# Patient Record
Sex: MALE
[De-identification: names, ages, dates, MRNs, and addresses within clinical notes are randomized per-mention and may not be internally consistent; named-entity substitution may affect disease eponyms.]

---

## 2019-06-19 ENCOUNTER — APPOINTMENT (OUTPATIENT)
Dept: UROLOGY | Facility: CLINIC | Age: 64
End: 2019-06-19
Payer: MEDICARE

## 2019-06-19 DIAGNOSIS — N13.8 BENIGN PROSTATIC HYPERPLASIA WITH LOWER URINARY TRACT SYMPMS: ICD-10-CM

## 2019-06-19 DIAGNOSIS — N40.1 BENIGN PROSTATIC HYPERPLASIA WITH LOWER URINARY TRACT SYMPMS: ICD-10-CM

## 2019-06-19 PROCEDURE — 99204 OFFICE O/P NEW MOD 45 MIN: CPT | Mod: 25

## 2019-06-19 PROCEDURE — 76705 ECHO EXAM OF ABDOMEN: CPT

## 2019-06-19 PROCEDURE — 99203 OFFICE O/P NEW LOW 30 MIN: CPT | Mod: 25

## 2019-06-19 PROCEDURE — 51798 US URINE CAPACITY MEASURE: CPT

## 2019-06-19 NOTE — ASSESSMENT
[FreeTextEntry1] : BPH, LUTS, risk for diabetic cystopathy\par PVR 30 cc; emptying adequately.\par Plan for tamsulosin trial, FUV in 3mo, possible anticholinergic in future or Myrbetriq\par PSA follow up in 6mo \par \par

## 2019-06-19 NOTE — PHYSICAL EXAM
[General Appearance - Well Developed] : well developed [General Appearance - Well Nourished] : well nourished [Normal Appearance] : normal appearance [Well Groomed] : well groomed [General Appearance - In No Acute Distress] : no acute distress [Edema] : no peripheral edema [Respiration, Rhythm And Depth] : normal respiratory rhythm and effort [] : no respiratory distress [Exaggerated Use Of Accessory Muscles For Inspiration] : no accessory muscle use [Abdomen Soft] : soft [Abdomen Tenderness] : non-tender [Abdomen Mass (___ Cm)] : no abdominal mass palpated [Abdomen Hernia] : no hernia was discovered [Costovertebral Angle Tenderness] : no ~M costovertebral angle tenderness [Urethral Meatus] : meatus normal [Urinary Bladder Findings] : the bladder was normal on palpation [Scrotum] : the scrotum was normal [Epididymis] : the epididymides were normal [Testes Tenderness] : no tenderness of the testes [Testes Mass (___cm)] : there were no testicular masses [Prostate Tenderness] : the prostate was not tender [No Prostate Nodules] : no prostate nodules [Prostate Size ___ gm] : prostate size [unfilled] gm [No Focal Deficits] : no focal deficits [No Palpable Adenopathy] : no palpable adenopathy [Inguinal Lymph Nodes Enlarged Bilaterally] : inguinal [FreeTextEntry1] : normal anal sphincter tone

## 2019-06-19 NOTE — HISTORY OF PRESENT ILLNESS
[FreeTextEntry1] : CC: Elevated PSA \par \par HPI: Patient with history of elevated PSA and a prostate biopsy 18 months ago, and he states he bled for a week.  He states the biopsy was negative for cancer  Patient is not on PDEI or alpha blocker.\par \par Associated urinary symptoms include: nocturia 2x, frequency during the day worse with DM medication exacerbates it, a sense of fullness of urination, some urgency and cannot hold and sometimes leaks if "cannot hold it any more", "never wet the pants" but "oh my God its leaking one drop and need to get to bathroom".  \par \par US today, PVR 30, prostate 58cc, bladder normal appearing, no lesions, no median lobe seen, no stones. \par \par Cr 0.99\par UA negative blood, LE, nitrite\par PSA 4.1 ng/mL 6/2019\par PSA 5.0 ng/mL 12/2018\par PSA prior "was always between 4 and 5, never above 5"\par \par MEDHX: DM2\par FAMHX: Negative \par SURGHX: Denies \par SOCIAL: , 2 children, no alcohol, nonsmoker \par ROS: Joint swelling, weight gain, skin issues, otherwise negative 10 systems\par

## 2019-06-28 ENCOUNTER — APPOINTMENT (OUTPATIENT)
Dept: ENDOCRINOLOGY | Facility: CLINIC | Age: 64
End: 2019-06-28
Payer: MEDICARE

## 2019-06-28 VITALS
HEIGHT: 70 IN | WEIGHT: 234 LBS | DIASTOLIC BLOOD PRESSURE: 76 MMHG | BODY MASS INDEX: 33.5 KG/M2 | SYSTOLIC BLOOD PRESSURE: 127 MMHG | HEART RATE: 73 BPM

## 2019-06-28 DIAGNOSIS — E11.9 TYPE 2 DIABETES MELLITUS W/OUT COMPLICATIONS: ICD-10-CM

## 2019-06-28 DIAGNOSIS — Z00.00 ENCOUNTER FOR GENERAL ADULT MEDICAL EXAMINATION W/OUT ABNORMAL FINDINGS: ICD-10-CM

## 2019-06-28 PROCEDURE — 83036 HEMOGLOBIN GLYCOSYLATED A1C: CPT | Mod: QW

## 2019-06-28 PROCEDURE — 97802 MEDICAL NUTRITION INDIV IN: CPT

## 2019-06-28 PROCEDURE — 99204 OFFICE O/P NEW MOD 45 MIN: CPT | Mod: 25

## 2019-06-28 PROCEDURE — 82962 GLUCOSE BLOOD TEST: CPT

## 2019-06-28 RX ORDER — METOPROLOL SUCCINATE 25 MG/1
25 TABLET, EXTENDED RELEASE ORAL
Qty: 30 | Refills: 0 | Status: ACTIVE | COMMUNITY
Start: 2018-05-14

## 2019-06-28 NOTE — PHYSICAL EXAM
[No Lid Lag] : no lid lag [No Proptosis] : no proptosis [Normal Hearing] : hearing was normal [Clear to Auscultation] : lungs were clear to auscultation bilaterally [Normal S1, S2] : normal S1 and S2 [No Edema] : there was no peripheral edema [Regular Rhythm] : with a regular rhythm [Normal Sensation on Monofilament Testing] : normal sensation on monofilament testing of lower extremities [Normal Affect] : the affect was normal [Normal Mood] : the mood was normal [Foot Ulcers] : no foot ulcers [de-identified] : fingerstick 119, fasting, walked here from apartment [de-identified] : thyroid palpable, suboptimal exam (large neck) [de-identified] : 1+ DP pulse [de-identified] : no onychomycoses, (+) hair growth on feet

## 2019-06-28 NOTE — DATA REVIEWED
[FreeTextEntry1] : 6/19: A1c 7.7% (point of care)\par 6/19 (pt phone portal): tot chol 116, trig 87, HDL 42, LDL 57, Cr 0.99, glucose 146, AST 48, ALT 57\par 12/18 (on pt phone):  A1c 6.8%, tot chol 124, trig 127, HDL 46, LDL 57, Cr 0.87

## 2019-06-28 NOTE — ASSESSMENT
[FreeTextEntry1] : Diabetes, suboptimal.  goal A1c < 7.0%. \par Potential complications of diabetes reviewed (blindness, kidney disease, nerve damage) and importance of glucose control discussed to prevent complications. Refer to nutritionist. Limit carb portions (limit half cup for mashed potato, rice or pasta) and no juice or sugared beverages unless treating hypoglycemia (glucose < 70).  Test glucose once/day, alternating between fasting (am goal < 130) and bedtime/post dinner (goal < 180).\par discussed importance of diet, lifestyle and glucose control in prolonging his remaining insulin reserve.\par change glimepiride to Trulicity to help reduce A1c, appetite and weight.  Trulicity pen demonstrated.  Advised to eat slowly to prevent nausea, GERD side effects.\par ophtho recommended, referral given\par recommended increasing activity to 8K steps/day.\par suggested skipping avocado toast in am, and start eating at the 11am meal, so that he can fast around 12 hours from the previous night's eating.\par ok to start low dose enalapril for renal protection.  will check urine microalbumin next visit and if negative/normal, then will consider stopping, if BP is also normal.\par RTO 3 months

## 2019-06-28 NOTE — HISTORY OF PRESENT ILLNESS
[FreeTextEntry1] : Diabetes diagnosed 3 years ago.  parents  when he was young and he does not have siblings.\par PCP keeps adding meds or increasing meds and he wanted another opinion on his diabetes treatment.\par one touch ultra mini: 159 (9a).  testing mostly in am: 190, 155, 165, 188, 175, 190, 164.... range 150-190\par one 8pm check--328 last month, doesn't remember what he ate that time\par works as a , has late nights and working 60h /week\par gets very thirsty and drinks a lot of water.  urinates every 2 hours.\par breakfast is usually avocado toast.  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 \par saw OD for new glasses\par c/o increased fatigue over the past year. went for sleep study, does not have sleep apnea\par \par Meds:\par metformin 500mg bid, glimepiride 2mg/day, Jardiance 10mg\par atorvastatin 20mg, metoprolol 25mg\par enalapril 2.5mg--not taking\par aspirin 81mg\par supplements: omega 3, saw palmetto, zinc, glucosamine

## 2019-07-01 LAB
GLUCOSE BLDC GLUCOMTR-MCNC: 119
HBA1C MFR BLD HPLC: 7.7

## 2019-07-08 ENCOUNTER — MEDICATION RENEWAL (OUTPATIENT)
Age: 64
End: 2019-07-08

## 2019-07-25 ENCOUNTER — NON-APPOINTMENT (OUTPATIENT)
Age: 64
End: 2019-07-25

## 2019-07-25 ENCOUNTER — APPOINTMENT (OUTPATIENT)
Dept: OPHTHALMOLOGY | Facility: CLINIC | Age: 64
End: 2019-07-25
Payer: MEDICARE

## 2019-07-25 PROCEDURE — 92004 COMPRE OPH EXAM NEW PT 1/>: CPT

## 2019-07-25 PROCEDURE — 92134 CPTRZ OPH DX IMG PST SGM RTA: CPT

## 2019-07-25 PROCEDURE — 92225: CPT | Mod: RT

## 2019-08-02 ENCOUNTER — RX RENEWAL (OUTPATIENT)
Age: 64
End: 2019-08-02

## 2019-09-13 ENCOUNTER — RX RENEWAL (OUTPATIENT)
Age: 64
End: 2019-09-13

## 2019-09-25 ENCOUNTER — APPOINTMENT (OUTPATIENT)
Dept: UROLOGY | Facility: CLINIC | Age: 64
End: 2019-09-25

## 2019-10-03 ENCOUNTER — APPOINTMENT (OUTPATIENT)
Dept: ENDOCRINOLOGY | Facility: CLINIC | Age: 64
End: 2019-10-03

## 2019-11-06 ENCOUNTER — RX RENEWAL (OUTPATIENT)
Age: 64
End: 2019-11-06

## 2019-12-09 ENCOUNTER — RX RENEWAL (OUTPATIENT)
Age: 64
End: 2019-12-09

## 2020-03-17 ENCOUNTER — APPOINTMENT (OUTPATIENT)
Dept: ENDOCRINOLOGY | Facility: CLINIC | Age: 65
End: 2020-03-17
Payer: MEDICARE

## 2020-03-17 VITALS
HEART RATE: 64 BPM | DIASTOLIC BLOOD PRESSURE: 70 MMHG | WEIGHT: 187 LBS | BODY MASS INDEX: 26.77 KG/M2 | HEIGHT: 70 IN | SYSTOLIC BLOOD PRESSURE: 109 MMHG

## 2020-03-17 DIAGNOSIS — E78.00 PURE HYPERCHOLESTEROLEMIA, UNSPECIFIED: ICD-10-CM

## 2020-03-17 DIAGNOSIS — E04.9 NONTOXIC GOITER, UNSPECIFIED: ICD-10-CM

## 2020-03-17 PROCEDURE — 99214 OFFICE O/P EST MOD 30 MIN: CPT

## 2020-03-17 RX ORDER — DULAGLUTIDE 0.75 MG/.5ML
0.75 INJECTION, SOLUTION SUBCUTANEOUS
Qty: 4 | Refills: 5 | Status: DISCONTINUED | COMMUNITY
Start: 2019-06-28 | End: 2020-03-17

## 2020-03-17 RX ORDER — EMPAGLIFLOZIN 10 MG/1
10 TABLET, FILM COATED ORAL
Qty: 30 | Refills: 2 | Status: DISCONTINUED | COMMUNITY
Start: 2019-06-28 | End: 2020-03-17

## 2020-03-17 NOTE — ASSESSMENT
[FreeTextEntry1] : Diabetes, goal a1c < 7%.\par I expect A1c will be much lower today than last visit, given his significant weight loss.  \par commended pt on his weight loss.  OK to stay off Trulicity and Jardiance, as I don't think he needs these now.\par Reduce metformin to one tab/day.  I will discontinue if A1c < 6.0%\par continue statin therapy.\par Recommended discussing with PCP stopping beta blocker if he is having dizziness\par Refer to GI for new GI  symptoms.  he also never had colonoscopy.\par Send for thyroid sono to evaluate goiter (now more noticeable after his weight loss)\par RTO 6 months\par

## 2020-03-17 NOTE — HISTORY OF PRESENT ILLNESS
[FreeTextEntry1] : lost 60lb since last visit.  has been at this weight for past 2 months\par saw RD and followed her recommendations "more than 100%".  stopped drinking soda (he had been drinking 4-5 cans/day)\par eating only vegetable and protein and drinking a lot of water\par glucose ranging  in the mornings.\par Trulicity helped reduce his appetite but now he has been without Trulicity for past 1.5 months due to cost\par also is currently out of Jardiance.  still taking metformin\par has not been exercising, didn't have time.  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  He used to be very regular, having BM every morning.  now will go every 1-2 days and the consistency is much harder than before.  having 1/10 abdominal pain in the mid-abdomen.  no nausea or diarrhea\par no polyuria, polydipsia, SOB, chest pain, or neuropathy symptoms \par had some dizziness when he quickly arose from bed/lying position\par ophtho 7/19: no DR\par \par Meds:\par metformin 500mg bid, \par  Jardiance 10mg, Trulicity  --  not currently taking\par atorvastatin 20mg, metoprolol 25mg\par enalapril 2.5mg--not taking\par aspirin 81mg\par supplements: omega 3, saw palmetto, zinc, glucosamine

## 2020-03-17 NOTE — PHYSICAL EXAM
[Alert] : alert [Healthy Appearance] : healthy appearance [Normal Voice/Communication] : normal voice communication [No Proptosis] : no proptosis [No Lid Lag] : no lid lag [Normal Hearing] : hearing was normal [No LAD] : no lymphadenopathy [Clear to Auscultation] : lungs were clear to auscultation bilaterally [Normal S1, S2] : normal S1 and S2 [Regular Rhythm] : with a regular rhythm [Pedal Pulses Normal] : the pedal pulses are present [No Edema] : there was no peripheral edema [Normal Sensation on Monofilament Testing] : normal sensation on monofilament testing of lower extremities [Normal Affect] : the affect was normal [Normal Mood] : the mood was normal [Foot Ulcers] : no foot ulcers [de-identified] : thyroid palpable, firm [de-identified] : no onychomycoses, (+) hair growth on feet

## 2020-06-29 RX ORDER — TAMSULOSIN HYDROCHLORIDE 0.4 MG/1
0.4 CAPSULE ORAL
Qty: 30 | Refills: 2 | Status: ACTIVE | COMMUNITY
Start: 2019-06-19 | End: 1900-01-01

## 2020-09-17 ENCOUNTER — RX RENEWAL (OUTPATIENT)
Age: 65
End: 2020-09-17

## 2020-09-21 ENCOUNTER — RX RENEWAL (OUTPATIENT)
Age: 65
End: 2020-09-21

## 2020-09-21 RX ORDER — ATORVASTATIN CALCIUM 20 MG/1
20 TABLET, FILM COATED ORAL DAILY
Qty: 90 | Refills: 0 | Status: ACTIVE | COMMUNITY
Start: 2019-03-06 | End: 1900-01-01

## 2020-10-26 ENCOUNTER — APPOINTMENT (OUTPATIENT)
Dept: ENDOCRINOLOGY | Facility: CLINIC | Age: 65
End: 2020-10-26
Payer: MEDICARE

## 2020-10-26 VITALS
SYSTOLIC BLOOD PRESSURE: 116 MMHG | DIASTOLIC BLOOD PRESSURE: 77 MMHG | HEART RATE: 68 BPM | BODY MASS INDEX: 28.27 KG/M2 | WEIGHT: 197 LBS

## 2020-10-26 PROCEDURE — 99214 OFFICE O/P EST MOD 30 MIN: CPT | Mod: 25

## 2020-10-26 PROCEDURE — 99072 ADDL SUPL MATRL&STAF TM PHE: CPT

## 2020-10-26 RX ORDER — LANCETS 33 GAUGE
EACH MISCELLANEOUS
Qty: 100 | Refills: 3 | Status: ACTIVE | COMMUNITY
Start: 2019-07-08 | End: 1900-01-01

## 2020-10-26 RX ORDER — BLOOD SUGAR DIAGNOSTIC
STRIP MISCELLANEOUS
Qty: 50 | Refills: 5 | Status: ACTIVE | COMMUNITY
Start: 2019-07-08 | End: 1900-01-01

## 2020-10-26 RX ORDER — METFORMIN HYDROCHLORIDE 500 MG/1
500 TABLET, COATED ORAL
Qty: 60 | Refills: 5 | Status: ACTIVE | COMMUNITY
Start: 2019-04-29 | End: 1900-01-01

## 2020-10-26 NOTE — PHYSICAL EXAM
[Alert] : alert [Healthy Appearance] : healthy appearance [No Proptosis] : no proptosis [No Lid Lag] : no lid lag [Normal Hearing] : hearing was normal [No LAD] : no lymphadenopathy [Clear to Auscultation] : lungs were clear to auscultation bilaterally [Normal S1, S2] : normal S1 and S2 [Regular Rhythm] : with a regular rhythm [No Edema] : no peripheral edema [Normal Sensation on Monofilament Testing] : normal sensation on monofilament testing of lower extremities [Normal Affect] : the affect was normal [Normal Mood] : the mood was normal [Acanthosis Nigricans] : no acanthosis nigricans [Foot Ulcers] : no foot ulcers [de-identified] : thyroid palpable [de-identified] : reduced pedal pulses [de-identified] : no onychomycoses, sparse hair growth on feet, callous on feet

## 2020-10-26 NOTE — HISTORY OF PRESENT ILLNESS
[FreeTextEntry1] : One touch mini: 128 today am.  160 (1p),  and then last check was in May--146 (6p) .  so not testing regularly (mostly not testing)\par no polyuria, polydipsia, SOB, chest pain.  some intermittent numbness at the tip of his big toes\par main meal is at 6pm\par weight is up 10 lb since last visit, but he says his weight normally fluctuates\par recently saw derm for dry scalp\par had prostate biopsy last Friday for high PSA (up to 11)\par overdue to see ophtho\par declines flu vaccine\par \par Meds:\par metformin 500mg one per day\par atorvastatin 20mg, metoprolol 25mg\par aspirin 81mg\par supplements: omega 3, saw palmetto, zinc, glucosamine\par Previous meds: Jardiance, Trulicity (stopped after losing weight), enalapril

## 2020-10-26 NOTE — ASSESSMENT
[FreeTextEntry1] : Diabetes, goal a1c < 7%.\par Increase metformin to BID dosing.  \par Advised to check glucose once/week in am (goal < 130) and once/week at 9p (goal < 160-170).  he should also check when having neuropathy symptoms (numbness) in big toe, to see if glucose is high at that time.\par ophtho recommended\par continue statin therapy\par RTO 6 months\par

## 2021-06-28 ENCOUNTER — APPOINTMENT (OUTPATIENT)
Dept: ENDOCRINOLOGY | Facility: CLINIC | Age: 66
End: 2021-06-28